# Patient Record
(demographics unavailable — no encounter records)

---

## 2025-03-20 NOTE — PHYSICAL EXAM
[5___] : hamstring 5[unfilled]/5 [Positive] : positive Angelo [All Views] : anteroposterior, lateral, skyline, and anteroposterior standing [There are no fractures, subluxations or dislocations. No significant abnormalities are seen] : There are no fractures, subluxations or dislocations. No significant abnormalities are seen [Left] : left knee [] : no calf tenderness [[id number]] : flexion 120 degrees [de-identified] : extension 0 degrees

## 2025-03-20 NOTE — DISCUSSION/SUMMARY
[de-identified] : modify activities use elastic sleeve for structural support try OTC meds ice as needed try topical lidocaine for pain control reviewed current medications used by this patient home exercises for functional return 03/20/2025    RE:  [facility]AM[facility]ÁN ORDOÑEZ   [id number]    Attention:  Nurse Reviewer /Medical Director  I am writing this letter as a medical necessity for PT program. Patient has tried analgesics, non-steroid anti-inflammatory agents,  hot or cold compresses,injections of corticosteroids, etc)  which in combination or by themselves has not worked. Based on my patient's condition, I strongly believe that the PT is medically needed.   Thank you for your time and consideration.

## 2025-03-20 NOTE — HISTORY OF PRESENT ILLNESS
[9] : 9 [7] : 7 [Dull/Aching] : dull/aching [Sharp] : sharp [Frequent] : frequent [Leisure] : leisure [Sleep] : sleep [Rest] : rest [Meds] : meds [Standing] : standing [Walking] : walking [Stairs] : stairs [de-identified] : 54 year old female with pain in the left knee, symptoms started 3/14/25, no specific injury,. She does not recall specific injury. Pain with sitting, getting up from a seated position and twisting motions. No mechanical symptoms. She has been taking OTC medication, icing and elevating with some help [] : no [FreeTextEntry1] : RIGHT KNEE [FreeTextEntry3] : 03/14/25 [FreeTextEntry5] : Patient started to feel soreness in the RIGHT KNEE on Friday and as the weekend went on the pain level increased and started to radiate. Patient states the pain is most prevalent after being seated. No prior issue